# Patient Record
Sex: FEMALE | Race: WHITE | NOT HISPANIC OR LATINO | Employment: OTHER | URBAN - METROPOLITAN AREA
[De-identification: names, ages, dates, MRNs, and addresses within clinical notes are randomized per-mention and may not be internally consistent; named-entity substitution may affect disease eponyms.]

---

## 2020-07-29 ENCOUNTER — TRANSCRIBE ORDERS (OUTPATIENT)
Dept: ADMINISTRATIVE | Facility: HOSPITAL | Age: 85
End: 2020-07-29

## 2020-07-29 DIAGNOSIS — Z12.31 ENCOUNTER FOR SCREENING MAMMOGRAM FOR MALIGNANT NEOPLASM OF BREAST: Primary | ICD-10-CM

## 2020-07-30 PROCEDURE — 88173 CYTOPATH EVAL FNA REPORT: CPT | Performed by: PATHOLOGY

## 2020-07-31 ENCOUNTER — LAB REQUISITION (OUTPATIENT)
Dept: LAB | Facility: HOSPITAL | Age: 85
End: 2020-07-31
Payer: COMMERCIAL

## 2020-07-31 DIAGNOSIS — N63.0 UNSPECIFIED LUMP IN UNSPECIFIED BREAST: ICD-10-CM

## 2020-10-20 ENCOUNTER — HOSPITAL ENCOUNTER (OUTPATIENT)
Dept: RADIOLOGY | Facility: HOSPITAL | Age: 85
Discharge: HOME | End: 2020-10-20
Attending: SURGERY
Payer: COMMERCIAL

## 2020-10-20 DIAGNOSIS — Z12.31 ENCOUNTER FOR SCREENING MAMMOGRAM FOR MALIGNANT NEOPLASM OF BREAST: ICD-10-CM

## 2020-10-20 PROCEDURE — 77063 BREAST TOMOSYNTHESIS BI: CPT

## 2020-10-20 PROCEDURE — 77067 SCR MAMMO BI INCL CAD: CPT

## 2020-10-27 ENCOUNTER — TELEPHONE (OUTPATIENT)
Dept: RADIOLOGY | Facility: HOSPITAL | Age: 85
End: 2020-10-27

## 2020-10-28 ENCOUNTER — TELEPHONE (OUTPATIENT)
Dept: RADIOLOGY | Facility: HOSPITAL | Age: 85
End: 2020-10-28

## 2020-10-30 ENCOUNTER — TRANSCRIBE ORDERS (OUTPATIENT)
Dept: ADMINISTRATIVE | Facility: HOSPITAL | Age: 85
End: 2020-10-30

## 2020-11-02 ENCOUNTER — HOSPITAL ENCOUNTER (OUTPATIENT)
Dept: RADIOLOGY | Facility: HOSPITAL | Age: 85
Discharge: HOME | End: 2020-11-02
Attending: SURGERY
Payer: COMMERCIAL

## 2020-11-02 VITALS — HEIGHT: 64 IN | BODY MASS INDEX: 29.02 KG/M2 | WEIGHT: 170 LBS

## 2020-11-02 DIAGNOSIS — R92.8 ABNORMAL SCREENING MAMMOGRAM: Primary | ICD-10-CM

## 2020-11-02 DIAGNOSIS — R92.8 ABNORMAL SCREENING MAMMOGRAM: ICD-10-CM

## 2020-11-02 PROBLEM — I10 ESSENTIAL HYPERTENSION: Status: ACTIVE | Noted: 2020-11-02

## 2020-11-02 PROBLEM — Z86.39 HX OF HYPERPARATHYROIDISM: Status: ACTIVE | Noted: 2020-11-02

## 2020-11-02 PROBLEM — F32.A DEPRESSION: Status: ACTIVE | Noted: 2020-11-02

## 2020-11-02 PROCEDURE — 76642 ULTRASOUND BREAST LIMITED: CPT

## 2020-11-02 PROCEDURE — G0279 TOMOSYNTHESIS, MAMMO: HCPCS

## 2020-11-02 PROCEDURE — U0003 INFECTIOUS AGENT DETECTION BY NUCLEIC ACID (DNA OR RNA); SEVERE ACUTE RESPIRATORY SYNDROME CORONAVIRUS 2 (SARS-COV-2) (CORONAVIRUS DISEASE [COVID-19]), AMPLIFIED PROBE TECHNIQUE, MAKING USE OF HIGH THROUGHPUT TECHNOLOGIES AS DESCRIBED BY CMS-2020-01-R: HCPCS | Performed by: SURGERY

## 2020-11-02 PROCEDURE — 77066 DX MAMMO INCL CAD BI: CPT

## 2020-11-03 LAB — SARS-COV-2 RNA SPEC QL NAA+PROBE: NOT DETECTED

## 2020-11-06 ENCOUNTER — HOSPITAL ENCOUNTER (OUTPATIENT)
Dept: RADIOLOGY | Facility: HOSPITAL | Age: 85
Discharge: HOME | End: 2020-11-06
Attending: SURGERY
Payer: COMMERCIAL

## 2020-11-06 VITALS — DIASTOLIC BLOOD PRESSURE: 72 MMHG | SYSTOLIC BLOOD PRESSURE: 146 MMHG

## 2020-11-06 DIAGNOSIS — R92.8 ABNORMAL SCREENING MAMMOGRAM: ICD-10-CM

## 2020-11-06 PROCEDURE — 88367 INSITU HYBRIDIZATION AUTO: CPT | Performed by: PATHOLOGY

## 2020-11-06 PROCEDURE — 88341 IMHCHEM/IMCYTCHM EA ADD ANTB: CPT | Performed by: PATHOLOGY

## 2020-11-06 PROCEDURE — 88342 IMHCHEM/IMCYTCHM 1ST ANTB: CPT | Performed by: PATHOLOGY

## 2020-11-06 PROCEDURE — 88305 TISSUE EXAM BY PATHOLOGIST: CPT | Performed by: PATHOLOGY

## 2020-11-06 PROCEDURE — 76942 ECHO GUIDE FOR BIOPSY: CPT

## 2020-11-06 PROCEDURE — 88361 TUMOR IMMUNOHISTOCHEM/COMPUT: CPT | Performed by: PATHOLOGY

## 2020-11-06 PROCEDURE — 19083 BX BREAST 1ST LESION US IMAG: CPT

## 2020-11-06 PROCEDURE — 19084 BX BREAST ADD LESION US IMAG: CPT

## 2020-11-06 RX ORDER — LIDOCAINE HYDROCHLORIDE 10 MG/ML
10 INJECTION, SOLUTION EPIDURAL; INFILTRATION; INTRACAUDAL; PERINEURAL ONCE
Status: COMPLETED | OUTPATIENT
Start: 2020-11-06 | End: 2020-11-06

## 2020-11-06 RX ADMIN — LIDOCAINE HYDROCHLORIDE 10 ML: 10 INJECTION, SOLUTION EPIDURAL; INFILTRATION; INTRACAUDAL; PERINEURAL at 14:04

## 2020-11-09 ENCOUNTER — TELEPHONE (OUTPATIENT)
Dept: RADIOLOGY | Facility: HOSPITAL | Age: 85
End: 2020-11-09

## 2020-11-11 ENCOUNTER — HOSPITAL ENCOUNTER (OUTPATIENT)
Dept: RADIOLOGY | Facility: HOSPITAL | Age: 85
Discharge: HOME | End: 2020-11-11
Attending: SURGERY

## 2020-11-12 ENCOUNTER — TELEPHONE (OUTPATIENT)
Dept: RADIOLOGY | Facility: HOSPITAL | Age: 85
End: 2020-11-12

## 2020-11-16 ENCOUNTER — TELEPHONE (OUTPATIENT)
Dept: RADIOLOGY | Facility: HOSPITAL | Age: 85
End: 2020-11-16

## 2021-02-11 DIAGNOSIS — Z23 ENCOUNTER FOR IMMUNIZATION: ICD-10-CM

## 2021-02-12 PROBLEM — C50.912 BILATERAL BREAST CANCER (HCC): Status: ACTIVE | Noted: 2021-02-12

## 2021-02-12 PROBLEM — C50.911 BILATERAL BREAST CANCER (HCC): Status: ACTIVE | Noted: 2021-02-12

## 2021-02-16 ENCOUNTER — TELEMEDICINE (OUTPATIENT)
Dept: RADIATION ONCOLOGY | Facility: HOSPITAL | Age: 86
End: 2021-02-16
Attending: RADIOLOGY

## 2021-02-16 VITALS — WEIGHT: 175 LBS | BODY MASS INDEX: 30.51 KG/M2

## 2021-02-16 DIAGNOSIS — Z17.0 MALIGNANT NEOPLASM OF UPPER-OUTER QUADRANT OF LEFT BREAST IN FEMALE, ESTROGEN RECEPTOR POSITIVE (HCC): ICD-10-CM

## 2021-02-16 DIAGNOSIS — C50.911 BILATERAL MALIGNANT NEOPLASM OF BREAST IN FEMALE, ESTROGEN RECEPTOR POSITIVE, UNSPECIFIED SITE OF BREAST (HCC): Primary | ICD-10-CM

## 2021-02-16 DIAGNOSIS — C50.412 MALIGNANT NEOPLASM OF UPPER-OUTER QUADRANT OF LEFT BREAST IN FEMALE, ESTROGEN RECEPTOR POSITIVE (HCC): ICD-10-CM

## 2021-02-16 DIAGNOSIS — Z17.0 BILATERAL MALIGNANT NEOPLASM OF BREAST IN FEMALE, ESTROGEN RECEPTOR POSITIVE, UNSPECIFIED SITE OF BREAST (HCC): Primary | ICD-10-CM

## 2021-02-16 DIAGNOSIS — Z17.0 MALIGNANT NEOPLASM OF UPPER-OUTER QUADRANT OF RIGHT BREAST IN FEMALE, ESTROGEN RECEPTOR POSITIVE (HCC): ICD-10-CM

## 2021-02-16 DIAGNOSIS — C50.912 BILATERAL MALIGNANT NEOPLASM OF BREAST IN FEMALE, ESTROGEN RECEPTOR POSITIVE, UNSPECIFIED SITE OF BREAST (HCC): Primary | ICD-10-CM

## 2021-02-16 DIAGNOSIS — C50.411 MALIGNANT NEOPLASM OF UPPER-OUTER QUADRANT OF RIGHT BREAST IN FEMALE, ESTROGEN RECEPTOR POSITIVE (HCC): ICD-10-CM

## 2021-02-16 PROBLEM — D05.11 DUCTAL CARCINOMA IN SITU (DCIS) OF RIGHT BREAST: Status: ACTIVE | Noted: 2021-02-16

## 2021-02-16 PROBLEM — M19.90 ARTHRITIS: Status: ACTIVE | Noted: 2021-02-16

## 2021-02-16 RX ORDER — AMLODIPINE BESYLATE AND BENAZEPRIL HYDROCHLORIDE 5; 40 MG/1; MG/1
CAPSULE ORAL
COMMUNITY
Start: 2020-12-04 | End: 2021-02-16

## 2021-02-16 RX ORDER — ALLOPURINOL 100 MG/1
100 TABLET ORAL 2 TIMES DAILY
COMMUNITY
Start: 2020-12-02 | End: 2021-02-16

## 2021-02-16 RX ORDER — ALLOPURINOL 100 MG/1
100 TABLET ORAL 2 TIMES DAILY
COMMUNITY
Start: 2020-12-02

## 2021-02-16 RX ORDER — ALLOPURINOL 300 MG/1
TABLET ORAL DAILY
COMMUNITY
End: 2021-02-16

## 2021-02-16 RX ORDER — AMLODIPINE BESYLATE AND BENAZEPRIL HYDROCHLORIDE 5; 40 MG/1; MG/1
1 CAPSULE ORAL DAILY
COMMUNITY
Start: 2020-12-04

## 2021-02-16 RX ORDER — LETROZOLE 2.5 MG/1
2.5 TABLET, FILM COATED ORAL DAILY
COMMUNITY
Start: 2021-02-08 | End: 2022-02-08

## 2021-02-16 RX ORDER — FUROSEMIDE 40 MG/1
TABLET ORAL DAILY
COMMUNITY

## 2021-02-16 RX ORDER — CITALOPRAM 40 MG/1
TABLET ORAL
COMMUNITY
Start: 2020-10-16

## 2021-02-16 NOTE — PROGRESS NOTES
Consultation - Radiation Oncology      MDJ:[id number] : 1933  Encounter: [id number]  Patient Information: Nanette Salamanca        REQUIRED DOCUMENTATION:     1  This service was provided via Telemedicine  2  Provider located at [facility]  3  TeleMed provider: Kari Love MD   4  Identify all parties in room with patient during tele consult:  The patient and myself  5  After connecting through Aggios, patient was identified by name and date of birth and assistant checked wristband  Patient was then informed that this was a Telemedicine visit and that the exam was being conducted confidentially over secure lines  My office door was closed  No one else was in the room  Patient acknowledged consent and understanding of privacy and security of the Telemedicine visit, and gave us permission to have the assistant stay in the room in order to assist with the history and to conduct the exam   I informed the patient that I have reviewed their record in Epic and presented the opportunity for them to ask any questions regarding the visit today  The patient agreed to participate  It was my intent to perform this visit via video technology but the patient was not able to do a video connection so the visit was completed via audio telephone only         CHIEF COMPLAINT  Chief Complaint   Patient presents with    Breast Cancer     Cancer Staging  Malignant neoplasm of upper-outer quadrant of left breast in female, estrogen receptor positive ([facility] [address] )  Staging form: Breast, AJCC 8th Edition  - Clinical stage from 2021: Stage IB (cT1b, cN1, cM0, G2, ER+, IL+, HER2-) - Signed by Kari Love MD on 2021  Stage prefix: Initial diagnosis  Histologic grading system: 3 grade system  - Pathologic stage from 2021: Stage IA (pT1a, pN1a, cM0, G2, ER+, IL+, HER2-) - Signed by Kari Love MD on 2021  Stage prefix: Initial diagnosis  Multigene prognostic tests performed: None  Histologic grading system: 3 grade system    Malignant neoplasm of upper-outer quadrant of right breast in female, estrogen receptor positive ([facility] [address] )  Staging form: Breast, AJCC 8th Edition  - Clinical stage from 2/16/2021: Stage IA (cT1b, cN0, cM0, G1, ER+, NC+, HER2-) - Signed by Cheko Godfrey MD on 2/22/2021  Stage prefix: Initial diagnosis  Histologic grading system: 3 grade system  - Pathologic stage from 2/16/2021: Stage IA (pT1b, pN0(sn), cM0, G2, ER+, NC+, HER2-) - Signed by Cheko Godfrey MD on 2/16/2021  Stage prefix: Initial diagnosis  Method of lymph node assessment: [place] lymph node biopsy  Multigene prognostic tests performed: None  Histologic grading system: 3 grade system           History of Present Illness   Essence Lopez is a 80y o  year old female who presents with with newly diagnosed ER/NC+ bilateral breast cancer  She self-identified a small palpable nodule in the left breast for which she underwent a non-diagnostic FNA in August 2020  She then underwent screening mammogram 10/20/20 that [facility] which was her 1st mammogram followed by diagnostic mammogram and breast US  She was found to have bilateral UOQ abnormalities as well as L axillary adenopathy  US guided biopsies done 11/6/20 revealed invasive breast carcinoma in the left breast with lymph node biopsy positive  The right breast revealed DCIS and a 1 mm adjacent focus with suspicion for invasive carcinoma      11/2/20 diagnostic bilateral mammogram- FINDINGS:   LEFT  2) MASS  Mammo diagnostic bilateral w 3d & cad: There is an 8 mm x 5 mm high density, oval mass with microlobulated margins seen in the upper outer quadrant of the left breast at 2 o'clock in the anterior depth  US breast bilateral limited (diagnostic):  There is a 7 mm x 6 mm x 4 mm oval, hypoechoic mass with microlobulated margins with no posterior features seen in the upper outer quadrant of the left breast at 1 o'clock in the anterior depth, 2 cm from the nipple  The mass correlates with the prior mammogram finding  3) CALCIFICATIONS  Mammo diagnostic bilateral w 3d & cad: There are coarse heterogeneous calcifications in a linear distribution seen in the upper outer quadrant of the left breast in the anterior depth  US breast bilateral limited (diagnostic): There are no corresponding calcifications seen on this modality  4) LYMPH NODE  Mammo diagnostic bilateral w 3d & cad: There is a 47 mm lymph node seen in the left axilla in the posterior depth, 19 cm from the nipple on the ML view  US breast bilateral limited (diagnostic): There is a 27 mm x 43 mm x 20 mm lymph node with microlobulated margins seen in the left axilla, 19 cm from the nipple  The lymph node correlates with the prior mammogram finding  Cortical thickness measures 11 mm on the left  Adjacent equally suspicious appearing lymph node is also seen in the axilla   It measures 19 x 25 x 11 mm      In the left anterior breast at the nipple margin there is a small subcutaneous indeterminate hypoechoic nodule versus complicated cyst        RIGHT  1) MASS  Mammo diagnostic bilateral w 3d & cad: There is a 15 mm high density, irregularly shaped mass with circumscribed margins seen in the upper outer quadrant of the right breast at 10 o'clock in the posterior depth  US breast bilateral limited (diagnostic): There is a 12 mm x 8 mm x 10 mm irregularly shaped, hypoechoic mass with circumscribed margins with no posterior features seen in the upper outer quadrant of the right breast in the posterior depth  The mass correlates with the prior mammogram finding   Some peripheral blood flow but no internal flow seen   Slightly heterogeneous echotexture internally       IMPRESSION:  -There are significant bilateral abnormalities   The right-sided breast mass will require ultrasound-guided core biopsy for definitive evaluation   -The left axillary adenopathy is markedly abnormal and the largest lymph node should be sampled with ultrasound guided biopsy     -The larger of the 2 nodules in the left breast should be sampled with ultrasound guided core biopsy   -For the time being, I would defer recommending stereotactic biopsy for the indeterminate left breast calcifications given the more worrisome findings mentioned above   I suspect that these will probably guide appropriate management for this patient   If the breast and axillary lymph node pathology is discordant or management for the breast would be altered by the presence of possible DCIS as reflected by the calcifications, a follow-up stereotactic core biopsy of the calcifications might then be reasonable      ASSESSMENT/BI-RADS CATEGORY:  Left: 4 - Suspicious  Right: 4 - Suspicious  Overall: 4 - Suspicious     11/30/20 Dr Karlos Tinajero- Ultrasound-guided core needle biopsies revealed invasive breast carcinoma in the L breast with LN biopsy + for involvement by carcinoma  The R breast revealed DCIS, and a 1 mm adjacent focus with suspicion for invasive carcinoma  Extensive discussion ensued with this patient and her daughter with regard to optimal local management  The most efficient management would be bilateral mastectomies, with L axillary lymph node dissection  However, the patient was reluctant to pursue this pathway  She prefers bilateral lumpectomies with L axillary LN dissection, followed by breast RT  To follow with Dr Taylor Nuñez for finalization of the management plan      1/4/21 Dr Taylor Nuñez- Currently she is scheduled for bilateral breast lumpectomy with bilateral SLN biopsy  Procedure discussed     1/13/21 Bilateral breast lumpectomies with sentinel lymph node biopsies     2/4/21 Dr Karlos Tinajero- ER/SD+ lymph node + disease on the left with extracapsular spread  ER/SD+ lymph node - disease on the right  Patient is healthy but she is 80 and therefore a poor candidate for aggressive combination adjuvant chemo tx  Reviewed risks and benefits of AI tx  Discussed need for staging with a PET  She will need bilateral breast RT and left axillary RT  Patient reports she had started letrozole 1 week ago and is tolerating this well  She denies any flashes  She has healed well from her surgery and is having no pain nor masses within the breasts bilaterally  She denies any previous history of cancer including no prior breast cancer  She denies any previous radiation therapy and no chemotherapy in the past   She remains active and lives alone  She cares for herself and continues to drive  She has 2 daughters that live nearby  She has been a  now for 2 years  Her   the age of 80  He was active and walked 5 miles a day including the day before he   He was on dialysis for renal failure  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 and then for the [employer] at [employer]  She would like to have a COVID vaccine and this needs to be scheduled       21 PET scan  3/19/21 Dr Kacey Bae   Oncology History   Bilateral breast cancer ([facility] [address] )   2020 Initial Diagnosis    Bilateral breast cancer ([facility] [address] )     2020 Biopsy    Final Diagnosis   A  Lymph Node, axillary core Biopsy:  -Fragments of  atypical ductal cells, consistent with metastatic carcinoma  See note  -Scant lymphoid tissue seen on deeper levels  Note:  Tumor cells are  positive for GATA3 ( breast marker) and  negative for Calponin   Controls reacted appropriately  This staining pattern supports the above diagnosis  B  Breast, Left core Biopsy:   - Invasive mammary carcinoma of no special type (ductal, not otherwise specified)      -- Daisy histologic grade 2 of 3 (total score: 7 of 9)        * Glandular (acinar) Tubular Differentiation < 10%, score 3        * Nuclear Pleomorphism 2-3 of 3, score 3        * Mitotic Rate ~2 mitoses/10HPF), score 1     -- Confirmed by tumor cell immunophenotype:        * Positive: E-cadherin, P120 - each in a membranous pattern  * Negative: p63, calponin-B    -- Invasive carcinoma involves all 3 submitted core biopsies, max  Dimension= 7 mm  -- Estrogen, Progesterone & HER2 receptor studies pending, to be described in an  addendum   - Ductal carcinoma in-situ (DCIS): Present/ minor component (<10% of total tumor)  -- Architectural Patterns:   cribriform and solid    -- Nuclear grade:              intermediate    -- Necrosis:                      not seen  - Lymphovascular invasion: Present  - Microcalcifications:            absent  - Best representative tumor block:  B1    -- Sufficient tumor present for         Agendia Mammaprint/Blueprint (1 cm2 of invasive tumor in aggregate):No         MI Profile/Foundation One (at least 5 x 5 mm of tumor): Yes     C  Breast, Right, core Biopsy:  -Intraductal papilloma with atypical epithelial proliferation, 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 ( cribriform pattern)     -Small adjacent focus  (1 mm) suspicious for invasive carcinoma  See note  Note:  This focus is positive for Ecadherin, P120 and negative for calponin  Controls reacted appropriately   Further characterization will be performed on the resection specimen  1/13/2021 Surgery    Bilateral breast lumpectomies with sentinel lymph node biopsies    DIAGNOSIS :  A  right axillary sentinel lymph node #1, excision:  Negative for metastatic carcinoma in one lymph node; supported by  AE1/AE3 immunostains (0/1)  B  right axillary sentinel lymph node #2, excision:  Negative for metastatic carcinoma in one lymph node; supported by  AE1/AE3 immunostains (0/1)  C  right axillary non-sentinel lymph node #1, excision:  Negative for metastatic carcinoma in two lymph nodes (0/2)  D  right axillary non-sentinel lymph node #2, excision:  Benign fibrofatty tissue; no definitive lymph nodes seen  No malignancy seen      E  right breast, lumpectomy:  Invasive ductal carcinoma with solid papillary features, intermediate  grade, in the background of ductal carcinoma in situ, 0 9 cm  Negative for lymphovascular invasion  Biopsy site changes  Negative for carcinoma at anterior, posterior, superior, inferior,  lateral, and medial margins (closest is 0 2 cm from inferior margin)  pT1b N0 Mx    See comments and synoptic report for details    F  left breast, lumpectomy:  Invasive and in situ ductal carcinoma, intermediate grade, 0 3 cm  Positive for lymphovascular invasion  Biopsy site changes  Negative for carcinoma at anterior, posterior, superior, inferior,  lateral, and medial margins (closest is 0 2 cm from superior margin)  pT1a N1a Mx    Comments: Immunostains for p63 and calponin have been performed on  block F4, which highlights myoepithelial cells around the foci if  DCIS  G  left axillary contents, excision:  Positive for metastatic carcinoma in two of nine lymph nodes (2/9)  The largest metastatic focus measures 3 1 cm  Positive for extranodal extension       2/9/2021 -  Hormone Therapy    Letrozole     Malignant neoplasm of upper-outer quadrant of left breast in female, estrogen receptor positive ([facility] [address] )   2/16/2021 Initial Diagnosis    Malignant neoplasm of upper-outer quadrant of left breast in female, estrogen receptor positive ([facility] [address] )     2/16/2021 -  Cancer Staged    Staging form: Breast, AJCC 8th Edition  - Pathologic stage from 2/16/2021: Stage IA (pT1a, pN1a, cM0, G2, ER+, MD+, HER2-) - Signed by Earl Miller MD on 2/16/2021  Stage prefix: Initial diagnosis  Multigene prognostic tests performed: None  Histologic grading system: 3 grade system       2/16/2021 -  Cancer Staged    Staging form: Breast, AJCC 8th Edition  - Clinical stage from 2/16/2021: Stage IB (cT1b, cN1, cM0, G2, ER+, MD+, HER2-) - Signed by Earl Miller MD on 2/22/2021  Stage prefix: Initial diagnosis  Histologic grading system: 3 grade system       Malignant neoplasm of upper-outer quadrant of right breast in female, estrogen receptor positive ([facility] [address] )   2021 Initial Diagnosis    Malignant neoplasm of upper-outer quadrant of right breast in female, estrogen receptor positive ([facility] [address] )     2021 -  Cancer Staged    Staging form: Breast, AJCC 8th Edition  - Pathologic stage from 2021: Stage IA (pT1b, pN0(sn), cM0, G2, ER+, GA+, HER2-) - Signed by Jesenia Gil MD on 2021  Stage prefix: Initial diagnosis  Method of lymph node assessment: [place] lymph node biopsy  Multigene prognostic tests performed: None  Histologic grading system: 3 grade system       2021 -  Cancer Staged    Staging form: Breast, AJCC 8th Edition  - Clinical stage from 2021: Stage IA (cT1b, cN0, cM0, G1, ER+, GA+, HER2-) - Signed by Jesenia Gil MD on 2021  Stage prefix: Initial diagnosis  Histologic grading system: 3 grade system         OB/GYN History:  The patient underwent menarche at 15 years  Menopause Status Pre, Carmen, Unknown and No Answer  No LMP recorded  Patient is postmenopausal   Menopause at 48 years  Menopause Reason natural  Hormone replacement therapy: no   Years used   2   Para 3   Age at first delivery being 25 years     Nursing: no    Birth control pills: no   Years used  Pregnancy test needed:  no     Past Medical History:   Diagnosis Date    Hx of hyperparathyroidism 2020     Past Surgical History:   Procedure Laterality Date    BREAST BIOPSY Left 2020    benign    BREAST CYST EXCISION Bilateral     benign    CHOLECYSTECTOMY      US GUIDED BREAST BIOPSY LEFT COMPLETE Left 2020    US GUIDED BREAST BIOPSY RIGHT COMPLETE Right 2020    US GUIDED BREAST LYMPH NODE BIOPSY LEFT Left 2020       Family History   Problem Relation Age of Onset    Breast cancer Daughter 36    No Known Problems Sister     Breast cancer Paternal Aunt 28       Social History   Social History     Substance and Sexual Activity   Alcohol Use Yes    Alcohol/week: 4 0 standard drinks    Types: 4 Glasses of wine per week    Frequency: 4 or more times a week    Drinks per session: 1 or 2    Binge frequency: Never    Comment: 1 glass of wine per night     Social History     Substance and Sexual Activity   Drug Use Not on file     Social History     Tobacco Use   Smoking Status Never Smoker   Smokeless Tobacco Never Used         Meds/Allergies     Current Outpatient Medications:     Acetaminophen 325 MG CAPS, Take 650 mg by mouth every 6 (six) hours as needed, Disp: , Rfl:     allopurinol (ZYLOPRIM) 100 mg tablet, Take 100 mg by mouth 2 (two) times a day, Disp: , Rfl:     amLODIPine-benazepril (LOTREL) 5-40 MG per capsule, Take 1 capsule by mouth daily, Disp: , Rfl:     apixaban (ELIQUIS) 2 5 mg, Take 2 5 mg by mouth 2 (two) times a day, Disp: , Rfl:     citalopram (CeleXA) 40 mg tablet, TAKE 1 TABLET BY MOUTH EVERY DAY, Disp: , Rfl:     furosemide (Lasix) 40 mg tablet, Take by mouth Daily, Disp: , Rfl:     letrozole (FEMARA) 2 5 mg tablet, Take 2 5 mg by mouth daily, Disp: , Rfl:   No Known Allergies    Review of Systems   Constitutional:   Lives alone  Able to self care and drive  2 daughters assist    HENT: Negative  Eyes: Negative  Respiratory: Negative  Cardiovascular: Positive for leg swelling  Gastrointestinal: Positive for constipation  Endocrine: Negative  Genitourinary:   Incontinent  Uses pads at bedtime  Musculoskeletal: Positive for arthralgias and gait problem (unsteady  walker and cane  )  Gout  Had toe removed in March  Fall in March, trying to carry a carpet out of garage  Tore rotator cuff, right arm limited ROM  Had MRI for dx  Surgery not recommended  Did PT  Was doing PT at home until breast surgery  Skin:   Pruritis at incision site  Scratching at night, tore open scab  Some drainage/clear, which has mostly resolved  Seeing surgeon on Thursday  Denies swelling or erythema  Allergic/Immunologic: Negative  Neurological: Positive for numbness (fingers)  Psychiatric/Behavioral: Positive for dysphoric mood (sadness related to loss  Denies SI/HI)  Lost  2 years ago, son in law last year  Lost 1year old grandchild  Oldest daughter has breast cancer - 20 years ago  OBJECTIVE:   Wt 79 4 kg (175 lb)   BMI 30 51 kg/m²   Pain Assessment:  0  Performance Status: ECOG/Zubrod/WHO: 0 - Asymptomatic    Physical Exam   Telemedicine phone visit, so no physical examination  RESULTS  Lab Results    Chemistry    No results found for: NA, K, CL, CO2, BUN, CREATININE No results found for: CALCIUM, ALKPHOS, AST, ALT, BILITOT       No results found for: WBC, HGB, HCT, MCV, PLT    Imaging Studies: See Above    Pathology: See Above    ASSESSMENT  1  Bilateral malignant neoplasm of breast in female, estrogen receptor positive, unspecified site of breast [facility])  Radiation Simulation Treatment   2  Malignant neoplasm of upper-outer quadrant of left breast in female, estrogen receptor positive ([facility] [address] )  Radiation Simulation Treatment   3   Malignant neoplasm of upper-outer quadrant of right breast in female, estrogen receptor positive ([facility] [address] )  Radiation Simulation Treatment      Cancer Staging  Malignant neoplasm of upper-outer quadrant of left breast in female, estrogen receptor positive ([facility] [address] )  Staging form: Breast, AJCC 8th Edition  - Clinical stage from 2/16/2021: Stage IB (cT1b, cN1, cM0, G2, ER+, TN+, HER2-) - Signed by Luca Bethea MD on 2/22/2021  Stage prefix: Initial diagnosis  Histologic grading system: 3 grade system  - Pathologic stage from 2/16/2021: Stage IA (pT1a, pN1a, cM0, G2, ER+, TN+, HER2-) - Signed by Luca Bethea MD on 2/16/2021  Stage prefix: Initial diagnosis  Multigene prognostic tests performed: None  Histologic grading system: 3 grade system    Malignant neoplasm of upper-outer quadrant of right breast in female, estrogen receptor positive ([facility] [address] )  Staging form: Breast, AJCC 8th Edition  - Clinical stage from 2/16/2021: Stage IA (cT1b, cN0, cM0, G1, ER+, WI+, HER2-) - Signed by Karol Samano MD on 2/22/2021  Stage prefix: Initial diagnosis  Histologic grading system: 3 grade system  - Pathologic stage from 2/16/2021: Stage IA (pT1b, pN0(sn), cM0, G2, ER+, WI+, HER2-) - Signed by Karol Samano MD on 2/16/2021  Stage prefix: Initial diagnosis  Method of lymph node assessment: [place] lymph node biopsy  Multigene prognostic tests performed: None  Histologic grading system: 3 grade system        PLAN/DISCUSSION  Orders Placed This Encounter   Procedures    Radiation Simulation Treatment          Zo Clifford is a 80y o  year old female with newly diagnosed hormone receptor positive bilateral breast carcinomas  She palpated a small nodule in the left breast in August 2020 and had a nondiagnostic FNA  She then had screening mammogram in October followed by diagnostic mammograms of biopsies in November that confirmed bilateral breast carcinomas  Left-sided biopsy revealed invasive mammary carcinoma grade 2 that was ER/WI positive with Her 2 Dianne negative  Right breast biopsy confirmed intraductal papilloma with low-grade ductal carcinoma in situ with a small adjacent 1 mm focus suspicious for invasive carcinoma  She then had bilateral breast lumpectomies with sentinel lymph node biopsies on January 13, 2021 with Dr Brian Hdz  This revealed invasive and in situ ductal carcinoma on the left side measuring 3 mm in size with lymphvascular invasion with negative margins  The closest margin was 2 mm  There were 2/9 left axillary lymph nodes that were positive with the largest focus measuring 3 1 cm with extranodal extension  The right lumpectomy revealed invasive ductal carcinoma in a background of ductal carcinoma measuring 9 mm in size that was negative for lymphovascular invasion  There were a total of 5 right axillary lymph nodes that were all negative    She has stage I disease that is hormone receptor positive bilaterally  She was seen by Dr Yesika Padilla who recommended she start on letrozole 1 week ago which she is tolerating well  Given her age, she is a poor candidate for aggressive combination adjuvant chemotherapy  We discussed that in order to complete her breast conservation management, she will require radiation therapy to the bilateral breast regions as well as the draining regional lymphatics in the left axilla and supraclavicular areas because she had positive lymph nodes on the left with extranodal extension  We discussed rationale for radiation therapy bilaterally including the acute side effects and potential chronic complications  We discussed a conventional course of treatment over 6 weeks because of the need to treat the draining lymphatics on the left side  We discussed breath hold technique of treatment to reduce dose to her heart and lungs if she is able to hold her breath  She consents to proceed with the radiation therapy and will be scheduled next week for simulation and treatment planning purposes at the [facility] which is also where she will be treated  She is also scheduled for staging PET-CT study on February 24, 2021  Ame Price MD  2/16/2021, 4:30 PM      Portions of the record may have been created with voice recognition software  Occasional wrong word or "sound a like" substitutions may have occurred due to the inherent limitations of voice recognition software  Read the chart carefully and recognize, using context, where substitutions have occurred

## 2021-02-16 NOTE — PROGRESS NOTES
Aminta Hill 1933 is a 80 y o  female Patient is a 80 y o female with newly diagnosed ER/NM+ bilateral breast cancer  She self-identified a small palpable nodule in the left breast for which she underwent a non-diagnostic FNA in August 2020  She then underwent screening mammogram followed by diagnostic mammogram and breast US  She was found to have bilateral UOQ abnormalities as well as L axillary adenopathy  US guided biopsies done 11/6/20 revealed invasive breast carcinoma in the left breast with lymph node biopsy positive  The right breast revealed DCIS and a 1 mm adjacent focus with suspicion for invasive carcinoma  11/2/20 diagnostic bilateral mammogram- FINDINGS:   LEFT  2) MASS  Mammo diagnostic bilateral w 3d & cad: There is an 8 mm x 5 mm high density, oval mass with microlobulated margins seen in the upper outer quadrant of the left breast at 2 o'clock in the anterior depth  US breast bilateral limited (diagnostic): There is a 7 mm x 6 mm x 4 mm oval, hypoechoic mass with microlobulated margins with no posterior features seen in the upper outer quadrant of the left breast at 1 o'clock in the anterior depth, 2 cm from the nipple  The mass correlates with the prior mammogram finding  3) CALCIFICATIONS  Mammo diagnostic bilateral w 3d & cad: There are coarse heterogeneous calcifications in a linear distribution seen in the upper outer quadrant of the left breast in the anterior depth  US breast bilateral limited (diagnostic): There are no corresponding calcifications seen on this modality  4) LYMPH NODE  Mammo diagnostic bilateral w 3d & cad: There is a 47 mm lymph node seen in the left axilla in the posterior depth, 19 cm from the nipple on the ML view  US breast bilateral limited (diagnostic): There is a 27 mm x 43 mm x 20 mm lymph node with microlobulated margins seen in the left axilla, 19 cm from the nipple  The lymph node correlates with the prior mammogram finding   Cortical thickness measures 11 mm on the left  Adjacent equally suspicious appearing lymph node is also seen in the axilla  It measures 19 x 25 x 11 mm  In the left anterior breast at the nipple margin there is a small subcutaneous indeterminate hypoechoic nodule versus complicated cyst        RIGHT  1) MASS  Mammo diagnostic bilateral w 3d & cad: There is a 15 mm high density, irregularly shaped mass with circumscribed margins seen in the upper outer quadrant of the right breast at 10 o'clock in the posterior depth  US breast bilateral limited (diagnostic): There is a 12 mm x 8 mm x 10 mm irregularly shaped, hypoechoic mass with circumscribed margins with no posterior features seen in the upper outer quadrant of the right breast in the posterior depth  The mass correlates with the prior mammogram finding  Some peripheral blood flow but no internal flow seen  Slightly heterogeneous echotexture internally  IMPRESSION:  -There are significant bilateral abnormalities  The right-sided breast mass will require ultrasound-guided core biopsy for definitive evaluation   -The left axillary adenopathy is markedly abnormal and the largest lymph node should be sampled with ultrasound guided biopsy     -The larger of the 2 nodules in the left breast should be sampled with ultrasound guided core biopsy   -For the time being, I would defer recommending stereotactic biopsy for the indeterminate left breast calcifications given the more worrisome findings mentioned above  I suspect that these will probably guide appropriate management for this patient  If the breast and axillary lymph node pathology is discordant or management for the breast would be altered by the presence of possible DCIS as reflected by the calcifications, a follow-up stereotactic core biopsy of the calcifications might then be reasonable      ASSESSMENT/BI-RADS CATEGORY:  Left: 4 - Suspicious  Right: 4 - Suspicious  Overall: 4 - Suspicious      11/30/20 Dr Carmen Booth- Ultrasound-guided core needle biopsies revealed invasive breast carcinoma in the L breast with LN biopsy + for involvement by carcinoma  The R breast revealed DCIS, and a 1 mm adjacent focus with suspicion for invasive carcinoma  Extensive discussion ensued with this patient and her daughter with regard to optimal local management  The most efficient management would be bilateral mastectomies, with L axillary lymph node dissection  However, the patient was reluctant to pursue this pathway  She prefers bilateral lumpectomies with L axillary LN dissection, followed by breast RT  To follow with Dr Corinne Carvajal for finalization of the management plan  1/4/21 Dr Corinne Carvajal- Currently she is scheduled for bilateral breast lumpectomy with bilateral SLN biopsy  Procedure discussed    1/13/21 Bilateral breast lumpectomies with sentinel lymph node biopsies    2/4/21 Dr Carmen Booth- ER/KS+ lymph node + disease on the left with extracapsular spread  ER/KS+ lymph node - disease on the right  Patient is healthy but she is 80 and therefore a poor candidate for aggressive combination adjuvant chemo tx  Reviewed risks and benefits of AI tx  Discussed need for staging with a PET  She will need bilateral breast RT and left axillary RT     2/24/21 PET scan  3/19/21 Dr Carmen Booth      Oncology History Overview Note   Patient is a 80 y o female with newly diagnosed ER/KS+ bilateral breast cancer  She self-identified a small palpable nodule in the left breast for which she underwent a non-diagnostic FNA in August 2020  She then underwent screening mammogram followed by diagnostic mammogram and breast US  She was found to have bilateral UOQ abnormalities as well as L axillary adenopathy  US guided biopsies done 11/6/20 revealed invasive breast carcinoma in the left breast with lymph node biopsy positive  The right breast revealed DCIS and a 1 mm adjacent focus with suspicion for invasive carcinoma        11/2/20 diagnostic bilateral mammogram- FINDINGS:   LEFT  2) MASS  Mammo diagnostic bilateral w 3d & cad: There is an 8 mm x 5 mm high density, oval mass with microlobulated margins seen in the upper outer quadrant of the left breast at 2 o'clock in the anterior depth  US breast bilateral limited (diagnostic): There is a 7 mm x 6 mm x 4 mm oval, hypoechoic mass with microlobulated margins with no posterior features seen in the upper outer quadrant of the left breast at 1 o'clock in the anterior depth, 2 cm from the nipple  The mass correlates with the prior mammogram finding  3) CALCIFICATIONS  Mammo diagnostic bilateral w 3d & cad: There are coarse heterogeneous calcifications in a linear distribution seen in the upper outer quadrant of the left breast in the anterior depth  US breast bilateral limited (diagnostic): There are no corresponding calcifications seen on this modality  4) LYMPH NODE  Mammo diagnostic bilateral w 3d & cad: There is a 47 mm lymph node seen in the left axilla in the posterior depth, 19 cm from the nipple on the ML view  US breast bilateral limited (diagnostic): There is a 27 mm x 43 mm x 20 mm lymph node with microlobulated margins seen in the left axilla, 19 cm from the nipple  The lymph node correlates with the prior mammogram finding  Cortical thickness measures 11 mm on the left  Adjacent equally suspicious appearing lymph node is also seen in the axilla  It measures 19 x 25 x 11 mm  In the left anterior breast at the nipple margin there is a small subcutaneous indeterminate hypoechoic nodule versus complicated cyst        RIGHT  1) MASS  Mammo diagnostic bilateral w 3d & cad: There is a 15 mm high density, irregularly shaped mass with circumscribed margins seen in the upper outer quadrant of the right breast at 10 o'clock in the posterior depth  US breast bilateral limited (diagnostic):  There is a 12 mm x 8 mm x 10 mm irregularly shaped, hypoechoic mass with circumscribed margins with no posterior features seen in the upper outer quadrant of the right breast in the posterior depth  The mass correlates with the prior mammogram finding  Some peripheral blood flow but no internal flow seen  Slightly heterogeneous echotexture internally  IMPRESSION:  -There are significant bilateral abnormalities  The right-sided breast mass will require ultrasound-guided core biopsy for definitive evaluation   -The left axillary adenopathy is markedly abnormal and the largest lymph node should be sampled with ultrasound guided biopsy     -The larger of the 2 nodules in the left breast should be sampled with ultrasound guided core biopsy   -For the time being, I would defer recommending stereotactic biopsy for the indeterminate left breast calcifications given the more worrisome findings mentioned above  I suspect that these will probably guide appropriate management for this patient  If the breast and axillary lymph node pathology is discordant or management for the breast would be altered by the presence of possible DCIS as reflected by the calcifications, a follow-up stereotactic core biopsy of the calcifications might then be reasonable  ASSESSMENT/BI-RADS CATEGORY:  Left: 4 - Suspicious  Right: 4 - Suspicious  Overall: 4 - Suspicious      11/30/20 Dr Davina Okeefe- Ultrasound-guided core needle biopsies revealed invasive breast carcinoma in the L breast with LN biopsy + for involvement by carcinoma  The R breast revealed DCIS, and a 1 mm adjacent focus with suspicion for invasive carcinoma  Extensive discussion ensued with this patient and her daughter with regard to optimal local management  The most efficient management would be bilateral mastectomies, with L axillary lymph node dissection  However, the patient was reluctant to pursue this pathway  She prefers bilateral lumpectomies with L axillary LN dissection, followed by breast RT   To follow with Dr Massimo Singer for finalization of the management plan     1/4/21 Dr Scooter Nuñez- Currently she is scheduled for bilateral breast lumpectomy with bilateral SLN biopsy  Procedure discussed    1/13/21 Bilateral breast lumpectomies with sentinel lymph node biopsies    2/4/21 Dr Adine Brunner- ER/NH+ lymph node + disease on the left with extracapsular spread  ER/NH+ lymph node - disease on the right  Patient is healthy but she is 80 and therefore a poor candidate for aggressive combination adjuvant chemo tx  Reviewed risks and benefits of AI tx  Discussed need for staging with a PET  She will need bilateral breast RT and left axillary RT     2/24/21 PET scan  3/19/21 Dr Adine Brunner     Bilateral breast cancer [facility])   11/6/2020 Initial Diagnosis    Bilateral breast cancer ([facility] [address] )     11/6/2020 Biopsy    Final Diagnosis   A  Lymph Node, axillary core Biopsy:  -Fragments of  atypical ductal cells, consistent with metastatic carcinoma  See note  -Scant lymphoid tissue seen on deeper levels  Note:  Tumor cells are  positive for GATA3 ( breast marker) and  negative for Calponin   Controls reacted appropriately  This staining pattern supports the above diagnosis  B  Breast, Left core Biopsy:   - Invasive mammary carcinoma of no special type (ductal, not otherwise specified)  -- [place] histologic grade 2 of 3 (total score: 7 of 9)        * Glandular (acinar) Tubular Differentiation < 10%, score 3        * Nuclear Pleomorphism 2-3 of 3, score 3        * Mitotic Rate ~2 mitoses/10HPF), score 1     -- Confirmed by tumor cell immunophenotype:        * Positive: E-cadherin, P120 - each in a membranous pattern  * Negative: p63, calponin-B    -- Invasive carcinoma involves all 3 submitted core biopsies, max  Dimension= 7 mm  -- Estrogen, Progesterone & HER2 receptor studies pending, to be described in an  addendum   - Ductal carcinoma in-situ (DCIS): Present/ minor component (<10% of total tumor)       -- Architectural Patterns:   cribriform and solid    -- Nuclear grade: intermediate    -- Necrosis:                      not seen  - Lymphovascular invasion: Present  - Microcalcifications:            absent  - Best representative tumor block:  B1    -- Sufficient tumor present for         Agendia Mammaprint/Blueprint (1 cm2 of invasive tumor in aggregate):No         MI Profile/Foundation One (at least 5 x 5 mm of tumor): Yes     C  Breast, Right, core Biopsy:  -Intraductal papilloma with atypical epithelial proliferation, 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 ( cribriform pattern)     -Small adjacent focus  (1 mm) suspicious for invasive carcinoma  See note  Note:  This focus is positive for Ecadherin, P120 and negative for calponin  Controls reacted appropriately   Further characterization will be performed on the resection specimen  1/13/2021 Surgery    Bilateral breast lumpectomies with sentinel lymph node biopsies    DIAGNOSIS :  A  right axillary sentinel lymph node #1, excision:  Negative for metastatic carcinoma in one lymph node; supported by  AE1/AE3 immunostains (0/1)  B  right axillary sentinel lymph node #2, excision:  Negative for metastatic carcinoma in one lymph node; supported by  AE1/AE3 immunostains (0/1)  C  right axillary non-sentinel lymph node #1, excision:  Negative for metastatic carcinoma in two lymph nodes (0/2)  D  right axillary non-sentinel lymph node #2, excision:  Benign fibrofatty tissue; no definitive lymph nodes seen  No malignancy seen  E  right breast, lumpectomy:  Invasive ductal carcinoma with solid papillary features, intermediate  grade, in the background of ductal carcinoma in situ, 0 9 cm  Negative for lymphovascular invasion  Biopsy site changes  Negative for carcinoma at anterior, posterior, superior, inferior,  lateral, and medial margins (closest is 0 2 cm from inferior margin)    pT1b N0 Mx    See comments and synoptic report for details    F  left breast, lumpectomy:  Invasive and in situ ductal carcinoma, intermediate grade, 0 3 cm  Positive for lymphovascular invasion  Biopsy site changes  Negative for carcinoma at anterior, posterior, superior, inferior,  lateral, and medial margins (closest is 0 2 cm from superior margin)  pT1a N1a Mx    Comments: Immunostains for p63 and calponin have been performed on  block F4, which highlights myoepithelial cells around the foci if  DCIS  G  left axillary contents, excision:  Positive for metastatic carcinoma in two of nine lymph nodes (2/9)  The largest metastatic focus measures 3 1 cm  Positive for extranodal extension       2/9/2021 -  Hormone Therapy    Letrozole         Clinical Trial: no    [unfilled]    Health Maintenance   Topic Date Due    Medicare Annual Wellness Visit (AWV)  1933    Depression Remission PHQ  10/19/1945    COVID-19 Vaccine (1 of 2) 10/19/1949    BMI: Followup Plan  10/19/1951    DTaP,Tdap,and Td Vaccines (1 - Tdap) 10/19/1954    Fall Risk  10/19/1998    Influenza Vaccine (1) 09/01/2020    BMI: Adult  11/02/2021    Pneumococcal Vaccine: 65+ Years  Completed    HIB Vaccine  Aged Out    Hepatitis B Vaccine  Aged Out    IPV Vaccine  Aged Out    Hepatitis A Vaccine  Aged Out    Meningococcal ACWY Vaccine  Aged Out    HPV Vaccine  Aged Out       Past Medical History:   Diagnosis Date    Hx of hyperparathyroidism 11/2/2020       Past Surgical History:   Procedure Laterality Date    BREAST BIOPSY Left 2020    benign    BREAST CYST EXCISION Bilateral     benign    US GUIDED BREAST BIOPSY LEFT COMPLETE Left 11/6/2020    US GUIDED BREAST BIOPSY RIGHT COMPLETE Right 11/6/2020    US GUIDED BREAST LYMPH NODE BIOPSY LEFT Left 11/6/2020       Family History   Problem Relation Age of Onset    Breast cancer Daughter 36    No Known Problems Sister     Breast cancer Paternal Aunt 28       Social History     Tobacco Use    Smoking status: Not on file   Substance Use Topics    Alcohol use: Not on file    Drug use: Not on file        No current outpatient medications on file  No Known Allergies     Review of Systems:  Review of Systems   Constitutional:        Lives alone  Able to self care and drive  2 daughters assist     HENT: Negative  Eyes: Negative  Respiratory: Negative  Cardiovascular: Positive for leg swelling  Gastrointestinal: Positive for constipation  Endocrine: Negative  Genitourinary:        Incontinent  Uses pads at bedtime  Musculoskeletal: Positive for arthralgias and gait problem (unsteady  walker and cane  )  Gout  Had toe removed in March  Fall in March, trying to carry a carpet out of garage  Tore rotator cuff, right arm limited ROM  Had MRI for dx  Surgery not recommended  Did PT  Was doing PT at home until breast surgery  Skin:        Pruritis at incision site  Scratching at night, tore open scab  Some drainage/clear, which has mostly resolved  Seeing surgeon on Thursday  Denies swelling or erythema  Allergic/Immunologic: Negative  Neurological: Positive for numbness (fingers)  Psychiatric/Behavioral: Positive for dysphoric mood (sadness related to loss  Denies SI/HI)  Lost  2 years ago, son in law last year  Lost 1year old grandchild  Oldest daughter has breast cancer - 20 years ago  There were no vitals filed for this visit  OB/GYN History:  The patient underwent menarche at 15 years  Menopause Status Pre, Carmen, Unknown and No Answer  No LMP recorded  Patient is postmenopausal   Menopause at 50} years  Menopause Reason natural  Hormone replacement therapy: no   Years used   2   Para 3   Age at first delivery being 25 years  Nursing: no    Birth control pills: no   Years used  Pregnancy test needed:  no    PFT    Imaging:No images are attached to the encounter       TeachingSIM side effects    MST none    Implantable Devices (Port, Pacemaker, pain stimulator)none    Hip Replacementno

## 2021-02-23 ENCOUNTER — RADIATION THERAPY TREATMENT (OUTPATIENT)
Dept: RADIATION ONCOLOGY | Facility: HOSPITAL | Age: 86
End: 2021-02-23
Attending: RADIOLOGY
Payer: COMMERCIAL

## 2021-02-23 PROCEDURE — 77334 RADIATION TREATMENT AID(S): CPT | Performed by: RADIOLOGY

## 2021-02-23 PROCEDURE — 77290 THER RAD SIMULAJ FIELD CPLX: CPT | Performed by: RADIOLOGY

## 2021-03-01 ENCOUNTER — APPOINTMENT (OUTPATIENT)
Dept: RADIATION ONCOLOGY | Facility: HOSPITAL | Age: 86
End: 2021-03-01
Attending: RADIOLOGY
Payer: COMMERCIAL

## 2021-03-05 DIAGNOSIS — Z17.0 BILATERAL MALIGNANT NEOPLASM OF BREAST IN FEMALE, ESTROGEN RECEPTOR POSITIVE, UNSPECIFIED SITE OF BREAST (HCC): Primary | ICD-10-CM

## 2021-03-05 DIAGNOSIS — C50.912 BILATERAL MALIGNANT NEOPLASM OF BREAST IN FEMALE, ESTROGEN RECEPTOR POSITIVE, UNSPECIFIED SITE OF BREAST (HCC): Primary | ICD-10-CM

## 2021-03-05 DIAGNOSIS — C50.911 BILATERAL MALIGNANT NEOPLASM OF BREAST IN FEMALE, ESTROGEN RECEPTOR POSITIVE, UNSPECIFIED SITE OF BREAST (HCC): Primary | ICD-10-CM

## 2021-03-09 ENCOUNTER — APPOINTMENT (OUTPATIENT)
Dept: RADIATION ONCOLOGY | Facility: HOSPITAL | Age: 86
End: 2021-03-09
Attending: RADIOLOGY
Payer: COMMERCIAL

## 2021-03-10 ENCOUNTER — APPOINTMENT (OUTPATIENT)
Dept: RADIATION ONCOLOGY | Facility: HOSPITAL | Age: 86
End: 2021-03-10
Attending: RADIOLOGY
Payer: COMMERCIAL

## 2021-03-11 ENCOUNTER — APPOINTMENT (OUTPATIENT)
Dept: RADIATION ONCOLOGY | Facility: HOSPITAL | Age: 86
End: 2021-03-11
Attending: RADIOLOGY
Payer: COMMERCIAL

## 2021-03-12 ENCOUNTER — APPOINTMENT (OUTPATIENT)
Dept: RADIATION ONCOLOGY | Facility: HOSPITAL | Age: 86
End: 2021-03-12
Attending: RADIOLOGY
Payer: COMMERCIAL

## 2021-03-12 DIAGNOSIS — Z17.0 MALIGNANT NEOPLASM OF UPPER-OUTER QUADRANT OF RIGHT BREAST IN FEMALE, ESTROGEN RECEPTOR POSITIVE (HCC): ICD-10-CM

## 2021-03-12 DIAGNOSIS — Z17.0 MALIGNANT NEOPLASM OF UPPER-OUTER QUADRANT OF LEFT BREAST IN FEMALE, ESTROGEN RECEPTOR POSITIVE (HCC): Primary | ICD-10-CM

## 2021-03-12 DIAGNOSIS — C50.411 MALIGNANT NEOPLASM OF UPPER-OUTER QUADRANT OF RIGHT BREAST IN FEMALE, ESTROGEN RECEPTOR POSITIVE (HCC): ICD-10-CM

## 2021-03-12 DIAGNOSIS — C50.412 MALIGNANT NEOPLASM OF UPPER-OUTER QUADRANT OF LEFT BREAST IN FEMALE, ESTROGEN RECEPTOR POSITIVE (HCC): Primary | ICD-10-CM

## 2021-03-12 PROCEDURE — 77295 3-D RADIOTHERAPY PLAN: CPT | Performed by: RADIOLOGY

## 2021-03-12 PROCEDURE — 77280 THER RAD SIMULAJ FIELD SMPL: CPT | Performed by: RADIOLOGY

## 2021-03-12 PROCEDURE — 77334 RADIATION TREATMENT AID(S): CPT | Performed by: RADIOLOGY

## 2021-03-12 PROCEDURE — 77300 RADIATION THERAPY DOSE PLAN: CPT | Performed by: RADIOLOGY

## 2021-03-15 ENCOUNTER — APPOINTMENT (OUTPATIENT)
Dept: RADIATION ONCOLOGY | Facility: HOSPITAL | Age: 86
End: 2021-03-15
Attending: RADIOLOGY
Payer: COMMERCIAL

## 2021-03-15 PROCEDURE — 77387 GUIDANCE FOR RADJ TX DLVR: CPT | Performed by: RADIOLOGY

## 2021-03-15 PROCEDURE — 77331 SPECIAL RADIATION DOSIMETRY: CPT | Performed by: RADIOLOGY

## 2021-03-15 PROCEDURE — 77412 RADIATION TX DELIVERY LVL 3: CPT | Performed by: RADIOLOGY

## 2021-03-16 ENCOUNTER — APPOINTMENT (OUTPATIENT)
Dept: RADIATION ONCOLOGY | Facility: HOSPITAL | Age: 86
End: 2021-03-16
Attending: RADIOLOGY
Payer: COMMERCIAL

## 2021-03-16 PROCEDURE — 77387 GUIDANCE FOR RADJ TX DLVR: CPT | Performed by: RADIOLOGY

## 2021-03-16 PROCEDURE — 77412 RADIATION TX DELIVERY LVL 3: CPT | Performed by: RADIOLOGY

## 2021-03-17 ENCOUNTER — APPOINTMENT (OUTPATIENT)
Dept: RADIATION ONCOLOGY | Facility: HOSPITAL | Age: 86
End: 2021-03-17
Attending: RADIOLOGY
Payer: COMMERCIAL

## 2021-03-17 PROCEDURE — 77412 RADIATION TX DELIVERY LVL 3: CPT | Performed by: RADIOLOGY

## 2021-03-17 PROCEDURE — 77387 GUIDANCE FOR RADJ TX DLVR: CPT | Performed by: RADIOLOGY

## 2021-03-18 ENCOUNTER — APPOINTMENT (OUTPATIENT)
Dept: RADIATION ONCOLOGY | Facility: HOSPITAL | Age: 86
End: 2021-03-18
Attending: RADIOLOGY
Payer: COMMERCIAL

## 2021-03-18 PROCEDURE — 77412 RADIATION TX DELIVERY LVL 3: CPT | Performed by: RADIOLOGY

## 2021-03-18 PROCEDURE — 77387 GUIDANCE FOR RADJ TX DLVR: CPT | Performed by: RADIOLOGY

## 2021-03-19 ENCOUNTER — APPOINTMENT (OUTPATIENT)
Dept: RADIATION ONCOLOGY | Facility: HOSPITAL | Age: 86
End: 2021-03-19
Attending: RADIOLOGY
Payer: COMMERCIAL

## 2021-03-19 PROCEDURE — 77336 RADIATION PHYSICS CONSULT: CPT | Performed by: RADIOLOGY

## 2021-03-19 PROCEDURE — 77412 RADIATION TX DELIVERY LVL 3: CPT | Performed by: RADIOLOGY

## 2021-03-19 PROCEDURE — 77417 THER RADIOLOGY PORT IMAGE(S): CPT | Performed by: RADIOLOGY

## 2021-03-19 PROCEDURE — 77387 GUIDANCE FOR RADJ TX DLVR: CPT | Performed by: RADIOLOGY

## 2021-03-21 ENCOUNTER — IMMUNIZATIONS (OUTPATIENT)
Dept: FAMILY MEDICINE CLINIC | Facility: HOSPITAL | Age: 86
End: 2021-03-21

## 2021-03-21 DIAGNOSIS — Z23 ENCOUNTER FOR IMMUNIZATION: Primary | ICD-10-CM

## 2021-03-21 PROCEDURE — 91300 SARS-COV-2 / COVID-19 MRNA VACCINE (PFIZER-BIONTECH) 30 MCG: CPT

## 2021-03-21 PROCEDURE — 0001A SARS-COV-2 / COVID-19 MRNA VACCINE (PFIZER-BIONTECH) 30 MCG: CPT

## 2021-03-22 ENCOUNTER — APPOINTMENT (OUTPATIENT)
Dept: RADIATION ONCOLOGY | Facility: HOSPITAL | Age: 86
End: 2021-03-22
Attending: RADIOLOGY
Payer: COMMERCIAL

## 2021-03-22 PROCEDURE — 77417 THER RADIOLOGY PORT IMAGE(S): CPT | Performed by: RADIOLOGY

## 2021-03-22 PROCEDURE — 77387 GUIDANCE FOR RADJ TX DLVR: CPT | Performed by: RADIOLOGY

## 2021-03-22 PROCEDURE — 77412 RADIATION TX DELIVERY LVL 3: CPT | Performed by: RADIOLOGY

## 2021-03-23 ENCOUNTER — APPOINTMENT (OUTPATIENT)
Dept: RADIATION ONCOLOGY | Facility: HOSPITAL | Age: 86
End: 2021-03-23
Attending: RADIOLOGY
Payer: COMMERCIAL

## 2021-03-23 PROCEDURE — 77387 GUIDANCE FOR RADJ TX DLVR: CPT | Performed by: RADIOLOGY

## 2021-03-23 PROCEDURE — 77412 RADIATION TX DELIVERY LVL 3: CPT | Performed by: RADIOLOGY

## 2021-03-24 ENCOUNTER — APPOINTMENT (OUTPATIENT)
Dept: RADIATION ONCOLOGY | Facility: HOSPITAL | Age: 86
End: 2021-03-24
Attending: RADIOLOGY
Payer: COMMERCIAL

## 2021-03-24 PROCEDURE — 77387 GUIDANCE FOR RADJ TX DLVR: CPT | Performed by: RADIOLOGY

## 2021-03-24 PROCEDURE — 77412 RADIATION TX DELIVERY LVL 3: CPT | Performed by: RADIOLOGY

## 2021-03-25 ENCOUNTER — APPOINTMENT (OUTPATIENT)
Dept: RADIATION ONCOLOGY | Facility: HOSPITAL | Age: 86
End: 2021-03-25
Attending: RADIOLOGY
Payer: COMMERCIAL

## 2021-03-25 PROCEDURE — 77412 RADIATION TX DELIVERY LVL 3: CPT | Performed by: RADIOLOGY

## 2021-03-25 PROCEDURE — 77387 GUIDANCE FOR RADJ TX DLVR: CPT | Performed by: RADIOLOGY

## 2021-03-26 ENCOUNTER — APPOINTMENT (OUTPATIENT)
Dept: RADIATION ONCOLOGY | Facility: HOSPITAL | Age: 86
End: 2021-03-26
Attending: RADIOLOGY
Payer: COMMERCIAL

## 2021-03-26 PROCEDURE — 77336 RADIATION PHYSICS CONSULT: CPT | Performed by: RADIOLOGY

## 2021-03-26 PROCEDURE — 77417 THER RADIOLOGY PORT IMAGE(S): CPT | Performed by: RADIOLOGY

## 2021-03-26 PROCEDURE — 77387 GUIDANCE FOR RADJ TX DLVR: CPT | Performed by: RADIOLOGY

## 2021-03-26 PROCEDURE — 77412 RADIATION TX DELIVERY LVL 3: CPT | Performed by: RADIOLOGY

## 2021-03-29 ENCOUNTER — APPOINTMENT (OUTPATIENT)
Dept: RADIATION ONCOLOGY | Facility: HOSPITAL | Age: 86
End: 2021-03-29
Attending: RADIOLOGY
Payer: COMMERCIAL

## 2021-03-29 PROCEDURE — 77412 RADIATION TX DELIVERY LVL 3: CPT | Performed by: RADIOLOGY

## 2021-03-29 PROCEDURE — 77387 GUIDANCE FOR RADJ TX DLVR: CPT | Performed by: RADIOLOGY

## 2021-03-30 ENCOUNTER — APPOINTMENT (OUTPATIENT)
Dept: RADIATION ONCOLOGY | Facility: HOSPITAL | Age: 86
End: 2021-03-30
Attending: RADIOLOGY
Payer: COMMERCIAL

## 2021-03-30 PROCEDURE — 77412 RADIATION TX DELIVERY LVL 3: CPT | Performed by: RADIOLOGY

## 2021-03-30 PROCEDURE — 77387 GUIDANCE FOR RADJ TX DLVR: CPT | Performed by: RADIOLOGY

## 2021-03-31 ENCOUNTER — APPOINTMENT (OUTPATIENT)
Dept: RADIATION ONCOLOGY | Facility: HOSPITAL | Age: 86
End: 2021-03-31
Attending: RADIOLOGY
Payer: COMMERCIAL

## 2021-03-31 PROCEDURE — 77387 GUIDANCE FOR RADJ TX DLVR: CPT | Performed by: RADIOLOGY

## 2021-03-31 PROCEDURE — 77412 RADIATION TX DELIVERY LVL 3: CPT | Performed by: RADIOLOGY

## 2021-04-01 ENCOUNTER — APPOINTMENT (OUTPATIENT)
Dept: RADIATION ONCOLOGY | Facility: HOSPITAL | Age: 86
End: 2021-04-01
Attending: RADIOLOGY
Payer: COMMERCIAL

## 2021-04-01 PROCEDURE — 77387 GUIDANCE FOR RADJ TX DLVR: CPT | Performed by: RADIOLOGY

## 2021-04-01 PROCEDURE — 77412 RADIATION TX DELIVERY LVL 3: CPT | Performed by: RADIOLOGY

## 2021-04-02 ENCOUNTER — APPOINTMENT (OUTPATIENT)
Dept: RADIATION ONCOLOGY | Facility: HOSPITAL | Age: 86
End: 2021-04-02
Attending: RADIOLOGY
Payer: COMMERCIAL

## 2021-04-02 PROCEDURE — 77336 RADIATION PHYSICS CONSULT: CPT | Performed by: RADIOLOGY

## 2021-04-02 PROCEDURE — 77387 GUIDANCE FOR RADJ TX DLVR: CPT | Performed by: RADIOLOGY

## 2021-04-02 PROCEDURE — 77412 RADIATION TX DELIVERY LVL 3: CPT | Performed by: RADIOLOGY

## 2021-04-05 ENCOUNTER — APPOINTMENT (OUTPATIENT)
Dept: RADIATION ONCOLOGY | Facility: HOSPITAL | Age: 86
End: 2021-04-05
Attending: RADIOLOGY
Payer: COMMERCIAL

## 2021-04-05 PROCEDURE — 77412 RADIATION TX DELIVERY LVL 3: CPT | Performed by: RADIOLOGY

## 2021-04-05 PROCEDURE — 77387 GUIDANCE FOR RADJ TX DLVR: CPT | Performed by: RADIOLOGY

## 2021-04-05 PROCEDURE — 77417 THER RADIOLOGY PORT IMAGE(S): CPT | Performed by: RADIOLOGY

## 2021-04-06 ENCOUNTER — APPOINTMENT (OUTPATIENT)
Dept: RADIATION ONCOLOGY | Facility: HOSPITAL | Age: 86
End: 2021-04-06
Attending: RADIOLOGY
Payer: COMMERCIAL

## 2021-04-06 PROCEDURE — 77387 GUIDANCE FOR RADJ TX DLVR: CPT | Performed by: RADIOLOGY

## 2021-04-06 PROCEDURE — 77412 RADIATION TX DELIVERY LVL 3: CPT | Performed by: RADIOLOGY

## 2021-04-07 ENCOUNTER — APPOINTMENT (OUTPATIENT)
Dept: RADIATION ONCOLOGY | Facility: HOSPITAL | Age: 86
End: 2021-04-07
Attending: RADIOLOGY
Payer: COMMERCIAL

## 2021-04-07 PROCEDURE — 77412 RADIATION TX DELIVERY LVL 3: CPT | Performed by: RADIOLOGY

## 2021-04-07 PROCEDURE — 77387 GUIDANCE FOR RADJ TX DLVR: CPT | Performed by: RADIOLOGY

## 2021-04-08 ENCOUNTER — APPOINTMENT (OUTPATIENT)
Dept: RADIATION ONCOLOGY | Facility: HOSPITAL | Age: 86
End: 2021-04-08
Attending: RADIOLOGY
Payer: COMMERCIAL

## 2021-04-08 PROCEDURE — 77412 RADIATION TX DELIVERY LVL 3: CPT | Performed by: RADIOLOGY

## 2021-04-08 PROCEDURE — 77387 GUIDANCE FOR RADJ TX DLVR: CPT | Performed by: RADIOLOGY

## 2021-04-09 ENCOUNTER — APPOINTMENT (OUTPATIENT)
Dept: RADIATION ONCOLOGY | Facility: HOSPITAL | Age: 86
End: 2021-04-09
Attending: RADIOLOGY
Payer: COMMERCIAL

## 2021-04-09 PROCEDURE — 77334 RADIATION TREATMENT AID(S): CPT | Performed by: RADIOLOGY

## 2021-04-09 PROCEDURE — 77307 TELETHX ISODOSE PLAN CPLX: CPT | Performed by: RADIOLOGY

## 2021-04-09 PROCEDURE — 77387 GUIDANCE FOR RADJ TX DLVR: CPT | Performed by: RADIOLOGY

## 2021-04-09 PROCEDURE — 77412 RADIATION TX DELIVERY LVL 3: CPT | Performed by: RADIOLOGY

## 2021-04-09 PROCEDURE — 77336 RADIATION PHYSICS CONSULT: CPT | Performed by: RADIOLOGY

## 2021-04-11 ENCOUNTER — IMMUNIZATIONS (OUTPATIENT)
Dept: FAMILY MEDICINE CLINIC | Facility: HOSPITAL | Age: 86
End: 2021-04-11

## 2021-04-11 DIAGNOSIS — Z23 ENCOUNTER FOR IMMUNIZATION: Primary | ICD-10-CM

## 2021-04-11 PROCEDURE — 91300 SARS-COV-2 / COVID-19 MRNA VACCINE (PFIZER-BIONTECH) 30 MCG: CPT

## 2021-04-11 PROCEDURE — 0002A SARS-COV-2 / COVID-19 MRNA VACCINE (PFIZER-BIONTECH) 30 MCG: CPT

## 2021-04-12 ENCOUNTER — APPOINTMENT (OUTPATIENT)
Dept: RADIATION ONCOLOGY | Facility: HOSPITAL | Age: 86
End: 2021-04-12
Attending: RADIOLOGY
Payer: COMMERCIAL

## 2021-04-12 PROCEDURE — 77387 GUIDANCE FOR RADJ TX DLVR: CPT | Performed by: RADIOLOGY

## 2021-04-12 PROCEDURE — 77412 RADIATION TX DELIVERY LVL 3: CPT | Performed by: RADIOLOGY

## 2021-04-13 ENCOUNTER — APPOINTMENT (OUTPATIENT)
Dept: RADIATION ONCOLOGY | Facility: HOSPITAL | Age: 86
End: 2021-04-13
Attending: RADIOLOGY
Payer: COMMERCIAL

## 2021-04-13 PROCEDURE — 77417 THER RADIOLOGY PORT IMAGE(S): CPT | Performed by: RADIOLOGY

## 2021-04-13 PROCEDURE — 77412 RADIATION TX DELIVERY LVL 3: CPT | Performed by: RADIOLOGY

## 2021-04-13 PROCEDURE — 77387 GUIDANCE FOR RADJ TX DLVR: CPT | Performed by: RADIOLOGY

## 2021-04-14 ENCOUNTER — APPOINTMENT (OUTPATIENT)
Dept: RADIATION ONCOLOGY | Facility: HOSPITAL | Age: 86
End: 2021-04-14
Attending: RADIOLOGY
Payer: COMMERCIAL

## 2021-04-14 PROCEDURE — 77412 RADIATION TX DELIVERY LVL 3: CPT | Performed by: RADIOLOGY

## 2021-04-14 PROCEDURE — 77387 GUIDANCE FOR RADJ TX DLVR: CPT | Performed by: RADIOLOGY

## 2021-04-15 ENCOUNTER — APPOINTMENT (OUTPATIENT)
Dept: RADIATION ONCOLOGY | Facility: HOSPITAL | Age: 86
End: 2021-04-15
Attending: RADIOLOGY
Payer: COMMERCIAL

## 2021-04-15 PROCEDURE — 77412 RADIATION TX DELIVERY LVL 3: CPT | Performed by: RADIOLOGY

## 2021-04-15 PROCEDURE — 77387 GUIDANCE FOR RADJ TX DLVR: CPT | Performed by: RADIOLOGY

## 2021-04-16 ENCOUNTER — APPOINTMENT (OUTPATIENT)
Dept: RADIATION ONCOLOGY | Facility: HOSPITAL | Age: 86
End: 2021-04-16
Attending: RADIOLOGY
Payer: COMMERCIAL

## 2021-04-16 PROCEDURE — 77336 RADIATION PHYSICS CONSULT: CPT | Performed by: RADIOLOGY

## 2021-04-16 PROCEDURE — 77387 GUIDANCE FOR RADJ TX DLVR: CPT | Performed by: RADIOLOGY

## 2021-04-16 PROCEDURE — 77412 RADIATION TX DELIVERY LVL 3: CPT | Performed by: RADIOLOGY

## 2021-04-19 ENCOUNTER — APPOINTMENT (OUTPATIENT)
Dept: RADIATION ONCOLOGY | Facility: HOSPITAL | Age: 86
End: 2021-04-19
Attending: RADIOLOGY
Payer: COMMERCIAL

## 2021-04-19 PROCEDURE — 77412 RADIATION TX DELIVERY LVL 3: CPT | Performed by: RADIOLOGY

## 2021-04-19 PROCEDURE — 77280 THER RAD SIMULAJ FIELD SMPL: CPT | Performed by: RADIOLOGY

## 2021-04-20 ENCOUNTER — APPOINTMENT (OUTPATIENT)
Dept: RADIATION ONCOLOGY | Facility: HOSPITAL | Age: 86
End: 2021-04-20
Attending: RADIOLOGY
Payer: COMMERCIAL

## 2021-04-20 PROCEDURE — 77387 GUIDANCE FOR RADJ TX DLVR: CPT | Performed by: RADIOLOGY

## 2021-04-20 PROCEDURE — 77412 RADIATION TX DELIVERY LVL 3: CPT | Performed by: RADIOLOGY

## 2021-04-21 ENCOUNTER — APPOINTMENT (OUTPATIENT)
Dept: RADIATION ONCOLOGY | Facility: HOSPITAL | Age: 86
End: 2021-04-21
Attending: RADIOLOGY
Payer: COMMERCIAL

## 2021-04-21 PROCEDURE — 77412 RADIATION TX DELIVERY LVL 3: CPT | Performed by: RADIOLOGY

## 2021-04-21 PROCEDURE — 77387 GUIDANCE FOR RADJ TX DLVR: CPT | Performed by: RADIOLOGY

## 2021-04-22 ENCOUNTER — APPOINTMENT (OUTPATIENT)
Dept: RADIATION ONCOLOGY | Facility: HOSPITAL | Age: 86
End: 2021-04-22
Attending: RADIOLOGY
Payer: COMMERCIAL

## 2021-04-22 PROCEDURE — 77331 SPECIAL RADIATION DOSIMETRY: CPT | Performed by: RADIOLOGY

## 2021-04-22 PROCEDURE — 77412 RADIATION TX DELIVERY LVL 3: CPT | Performed by: RADIOLOGY

## 2021-04-22 PROCEDURE — 77387 GUIDANCE FOR RADJ TX DLVR: CPT | Performed by: RADIOLOGY

## 2021-04-23 ENCOUNTER — APPOINTMENT (OUTPATIENT)
Dept: RADIATION ONCOLOGY | Facility: HOSPITAL | Age: 86
End: 2021-04-23
Attending: RADIOLOGY
Payer: COMMERCIAL

## 2021-04-23 PROCEDURE — 77336 RADIATION PHYSICS CONSULT: CPT | Performed by: RADIOLOGY

## 2021-04-23 PROCEDURE — 77387 GUIDANCE FOR RADJ TX DLVR: CPT | Performed by: RADIOLOGY

## 2021-04-23 PROCEDURE — 77412 RADIATION TX DELIVERY LVL 3: CPT | Performed by: RADIOLOGY

## 2021-04-27 ENCOUNTER — APPOINTMENT (OUTPATIENT)
Dept: RADIATION ONCOLOGY | Facility: HOSPITAL | Age: 86
End: 2021-04-27
Attending: RADIOLOGY
Payer: COMMERCIAL

## 2021-05-25 ENCOUNTER — CLINICAL SUPPORT (OUTPATIENT)
Dept: RADIATION ONCOLOGY | Facility: HOSPITAL | Age: 86
End: 2021-05-25
Attending: RADIOLOGY
Payer: COMMERCIAL

## 2021-05-25 VITALS
SYSTOLIC BLOOD PRESSURE: 145 MMHG | HEART RATE: 78 BPM | RESPIRATION RATE: 14 BRPM | TEMPERATURE: 98.2 F | DIASTOLIC BLOOD PRESSURE: 65 MMHG

## 2021-05-25 DIAGNOSIS — Z17.0 MALIGNANT NEOPLASM OF UPPER-OUTER QUADRANT OF LEFT BREAST IN FEMALE, ESTROGEN RECEPTOR POSITIVE (HCC): ICD-10-CM

## 2021-05-25 DIAGNOSIS — C50.012 BILATERAL MALIGNANT NEOPLASM OF AREOLA OF BREAST IN FEMALE, UNSPECIFIED ESTROGEN RECEPTOR STATUS (HCC): Primary | ICD-10-CM

## 2021-05-25 DIAGNOSIS — C50.912 BILATERAL MALIGNANT NEOPLASM OF BREAST IN FEMALE, ESTROGEN RECEPTOR POSITIVE, UNSPECIFIED SITE OF BREAST (HCC): Primary | ICD-10-CM

## 2021-05-25 DIAGNOSIS — C50.411 MALIGNANT NEOPLASM OF UPPER-OUTER QUADRANT OF RIGHT BREAST IN FEMALE, ESTROGEN RECEPTOR POSITIVE (HCC): ICD-10-CM

## 2021-05-25 DIAGNOSIS — C50.911 BILATERAL MALIGNANT NEOPLASM OF BREAST IN FEMALE, ESTROGEN RECEPTOR POSITIVE, UNSPECIFIED SITE OF BREAST (HCC): Primary | ICD-10-CM

## 2021-05-25 DIAGNOSIS — C50.011 BILATERAL MALIGNANT NEOPLASM OF AREOLA OF BREAST IN FEMALE, UNSPECIFIED ESTROGEN RECEPTOR STATUS (HCC): Primary | ICD-10-CM

## 2021-05-25 DIAGNOSIS — Z17.0 BILATERAL MALIGNANT NEOPLASM OF BREAST IN FEMALE, ESTROGEN RECEPTOR POSITIVE, UNSPECIFIED SITE OF BREAST (HCC): Primary | ICD-10-CM

## 2021-05-25 DIAGNOSIS — C50.412 MALIGNANT NEOPLASM OF UPPER-OUTER QUADRANT OF LEFT BREAST IN FEMALE, ESTROGEN RECEPTOR POSITIVE (HCC): ICD-10-CM

## 2021-05-25 DIAGNOSIS — Z17.0 MALIGNANT NEOPLASM OF UPPER-OUTER QUADRANT OF RIGHT BREAST IN FEMALE, ESTROGEN RECEPTOR POSITIVE (HCC): ICD-10-CM

## 2021-05-25 PROCEDURE — 99211 OFF/OP EST MAY X REQ PHY/QHP: CPT | Performed by: RADIOLOGY

## 2022-06-14 ENCOUNTER — TELEPHONE (OUTPATIENT)
Dept: RADIATION ONCOLOGY | Facility: HOSPITAL | Age: 87
End: 2022-06-14